# Patient Record
Sex: FEMALE | Race: WHITE | Employment: UNEMPLOYED | URBAN - METROPOLITAN AREA
[De-identification: names, ages, dates, MRNs, and addresses within clinical notes are randomized per-mention and may not be internally consistent; named-entity substitution may affect disease eponyms.]

---

## 2017-08-28 ENCOUNTER — HOSPITAL ENCOUNTER (INPATIENT)
Age: 32
LOS: 10 days | Discharge: HOME | DRG: 885 | End: 2017-09-07
Attending: STUDENT IN AN ORGANIZED HEALTH CARE EDUCATION/TRAINING PROGRAM | Admitting: STUDENT IN AN ORGANIZED HEALTH CARE EDUCATION/TRAINING PROGRAM
Payer: MEDICARE

## 2017-08-28 PROCEDURE — 65220000003 HC RM SEMIPRIVATE PSYCH

## 2017-08-28 RX ORDER — TRAZODONE HYDROCHLORIDE 50 MG/1
50 TABLET ORAL
Status: DISCONTINUED | OUTPATIENT
Start: 2017-08-28 | End: 2017-09-07 | Stop reason: HOSPADM

## 2017-08-28 RX ORDER — HALOPERIDOL 5 MG/ML
5 INJECTION INTRAMUSCULAR
Status: DISCONTINUED | OUTPATIENT
Start: 2017-08-28 | End: 2017-09-07 | Stop reason: HOSPADM

## 2017-08-28 RX ORDER — BENZTROPINE MESYLATE 1 MG/1
1-2 TABLET ORAL
Status: DISCONTINUED | OUTPATIENT
Start: 2017-08-28 | End: 2017-09-07 | Stop reason: HOSPADM

## 2017-08-28 RX ORDER — HALOPERIDOL 5 MG/1
5 TABLET ORAL
Status: DISCONTINUED | OUTPATIENT
Start: 2017-08-28 | End: 2017-09-07 | Stop reason: HOSPADM

## 2017-08-28 RX ORDER — LORAZEPAM 2 MG/ML
1-2 INJECTION INTRAMUSCULAR
Status: DISCONTINUED | OUTPATIENT
Start: 2017-08-28 | End: 2017-09-07 | Stop reason: HOSPADM

## 2017-08-28 RX ORDER — LORAZEPAM 1 MG/1
1-2 TABLET ORAL
Status: DISCONTINUED | OUTPATIENT
Start: 2017-08-28 | End: 2017-09-07 | Stop reason: HOSPADM

## 2017-08-28 RX ORDER — IBUPROFEN 600 MG/1
600 TABLET ORAL
Status: DISCONTINUED | OUTPATIENT
Start: 2017-08-28 | End: 2017-09-07 | Stop reason: HOSPADM

## 2017-08-28 RX ORDER — BENZTROPINE MESYLATE 1 MG/ML
1-2 INJECTION INTRAMUSCULAR; INTRAVENOUS
Status: DISCONTINUED | OUTPATIENT
Start: 2017-08-28 | End: 2017-09-07 | Stop reason: HOSPADM

## 2017-08-28 RX ORDER — HYDROXYZINE PAMOATE 50 MG/1
50 CAPSULE ORAL
Status: DISCONTINUED | OUTPATIENT
Start: 2017-08-28 | End: 2017-09-07 | Stop reason: HOSPADM

## 2017-08-28 NOTE — IP AVS SNAPSHOT
Wisam Gates 
 
 
 [address] [place] [address] 66 Patient: Juan Manuel Laguna MRN: [id number] :1985 You are allergic to the following Allergen Reactions Penicillins Anaphylaxis Emergency Contacts Name Discharge Info Relation Home Work Mobile Randy Pinedo DISCHARGE CAREGIVER [3] Parent [1] [phone] About your hospitalization You were admitted on:  2017 You last received care in the:  [facility] 1 ADULT CHEM DEP You were discharged on:  2017 Unit phone number:  [phone] Why you were hospitalized Your primary diagnosis was:  Not on File Your diagnoses also included:  Schizoaffective Disorder (Hcc) Providers Seen During Your Hospitalizations Provider Role Specialty Primary office phone Aracely Campbell MD Attending Provider Psychiatry [phone] Your Primary Care Physician (PCP) Primary Care Physician Office Phone Office Fax UNKNOWN, PROVIDER ** None ** ** None ** Follow-up Information Follow up With Details Comments Contact Info Provider Unknown   Patient not available to ask [place] On 2017 Intake appointment Maria D Perez at 8:00 a.m. [address] 
[phone] [place] On 2017 Medication management appointment with MAGY urrutia at 11:00 am [address] [place] Barros  Phone: [phone] Fax [phone] Current Discharge Medication List  
  
START taking these medications Dose & Instructions Dispensing Information Comments Morning Noon Evening Bedtime  
 lithium carbonate  mg CR tablet Commonly known as:  Alexx Thorne Your last dose was:     
   
Your next dose is:    
   
   
 Dose:  300 mg  
 Take 1 Tab by mouth two (2) times a day. Indications: Leeanne associated with Bipolar Disorder Quantity:  30 Tab Refills:  0  
     
   
   
   
  
 OLANZapine 10 mg tablet Commonly known as:  ZyPREXA Your last dose was: Your next dose is:    
   
   
 Dose:  10 mg Take 1 Tab by mouth nightly. Indications: Leeanne associated with Bipolar Disorder Quantity:  15 Tab Refills:  0 Where to Get Your Medications Information on where to get these meds will be given to you by the nurse or doctor. ! Ask your nurse or doctor about these medications  
  lithium carbonate  mg CR tablet OLANZapine 10 mg tablet Discharge Instructions BEHAVIORAL HEALTH NURSING DISCHARGE NOTE Numbers to remember: 
Debora Tierney: [phone] [facility] Emergency Services: [phone] Suicide Preventions: [phone] (TALK) Diet: Regular The discharge information has been reviewed with the patient. The patient verbalized understanding. Patient armband removed and shredded Discharge Orders None Wikidot Announcement We are excited to announce that we are making your provider's discharge notes available to you in Wikidot. You will see these notes when they are completed and signed by the physician that discharged you from your recent hospital stay. If you have any questions or concerns about any information you see in Wikidot, please call the Health Information Department where you were seen or reach out to your Primary Care Provider for more information about your plan of care. Introducing [facility]! [facility] introduces Wikidot patient portal. Now you can access parts of your medical record, email your doctor's office, and request medication refills online. 1. In your internet browser, go to [email or web address] 2. Click on the First Time User? Click Here link in the Sign In box.  You will see the New Member Sign Up page. 3. Enter your [employer] Access Code exactly as it appears below. You will not need to use this code after youve completed the sign-up process. If you do not sign up before the expiration date, you must request a new code. · [employer] Access Code: [email or web address] Expires: 11/27/2017  8:55 AM 
 
4. Enter the last four digits of your Social Security Number (xxxx) and Date of Birth (mm/dd/yyyy) as indicated and click Submit. You will be taken to the next sign-up page. 5. Create a [employer] ID. This will be your [employer] login ID and cannot be changed, so think of one that is secure and easy to remember. 6. Create a [employer] password. You can change your password at any time. 7. Enter your Password Reset Question and Answer. This can be used at a later time if you forget your password. 8. Enter your e-mail address. You will receive e-mail notification when new information is available in [address] 9. Click Sign Up. You can now view and download portions of your medical record. 10. Click the Download Summary menu link to download a portable copy of your medical information. If you have questions, please visit the Frequently Asked Questions section of the [employer] website. Remember, [employer] is NOT to be used for urgent needs. For medical emergencies, dial 911. Now available from your iPhone and Android! General Information Please provide this summary of care documentation to your next provider. Patient Signature:  ____________________________________________________________ Date:  ____________________________________________________________  
  
Sharad Early Provider Signature:  ____________________________________________________________ Date:  ____________________________________________________________

## 2017-08-28 NOTE — IP AVS SNAPSHOT
Summary of Care Report The Summary of Care report has been created to help improve care coordination. Users with access to [employer] or [address] (Web-based application) may access additional patient information including the Discharge Summary. If you are not currently a [address] user and need more information, please call the number listed below in the [place] [address] section and ask to be connected with Medical Records. Facility Information Name Address Phone [place] [id number] [facility] [address] [phone] Patient Information Patient Name Sex  Hi Dee ([id number]) Female 1985 Discharge Information Admitting Provider Service Area Unit Hetal Guardado MD / [address] [facility] 1 Adult Chem Dep / [phone] Discharge Provider Discharge Date/Time Discharge Disposition Destination (none) 2017 (Pending) AHR (none) Patient Language Language ENGLISH [13] Hospital Problems as of 2017  Never Reviewed Class Noted - Resolved Last Modified POA Active Problems Schizoaffective disorder ([facility] [address]  2017 - Present 2017 by Hetal Guardado MD Unknown Entered by Hetal Guardado MD  
  
You are allergic to the following Allergen Reactions Penicillins Anaphylaxis Current Discharge Medication List  
  
START taking these medications Dose & Instructions Dispensing Information Comments  
 lithium carbonate  mg CR tablet Commonly known as:  Ruthine Dearth Dose:  300 mg Take 1 Tab by mouth two (2) times a day. Indications: Leeanne associated with Bipolar Disorder Quantity:  30 Tab Refills:  0  
   
 OLANZapine 10 mg tablet Commonly known as:  ZyPREXA Dose:  10 mg Take 1 Tab by mouth nightly.  Indications: Leeanne associated with Bipolar Disorder Quantity:  15 Tab Refills:  0 Follow-up Information Follow up With Details Comments Contact Info Provider Unknown   Patient not available to ask [place] On 9/12/2017 Intake appointment Amanda Jacobson at 8:00 a.m. [address] 
[phone] Donavan On 9/19/2017 Medication management appointment with MAGY urrutia at 11:00 am [address] [place] Barros 68 Phone: [phone] Fax [phone] Discharge Instructions BEHAVIORAL HEALTH NURSING DISCHARGE NOTE Numbers to remember: 
[facility]: [phone] [facility]: [phone] Suicide Preventions: [phone] (TALK) Diet: Regular The discharge information has been reviewed with the patient. The patient verbalized understanding. Patient armband removed and shredded Chart Review Routing History No Routing History on File

## 2017-08-28 NOTE — PROGRESS NOTES
Patient escorted to [address] [place] [address] escorted by police and ; fidgety, unable to sit still, running fingers through her hair, frequently crossing her legs; stated, \"I'm not talking. \" patient refused to answer questions to complete the admission process. Patient requested to shower and accommodated as needed, then patient got in bed and slept for a short time, then tolerated well dinner; afterwards, patient retired to bed; will monitor and maintain safe environment. Patient stated that she is allergic to PCN when asked, if allergic to medications or foods.

## 2017-08-29 PROBLEM — F25.9 SCHIZOAFFECTIVE DISORDER (HCC): Status: ACTIVE | Noted: 2017-08-29

## 2017-08-29 PROCEDURE — 74011250637 HC RX REV CODE- 250/637: Performed by: STUDENT IN AN ORGANIZED HEALTH CARE EDUCATION/TRAINING PROGRAM

## 2017-08-29 PROCEDURE — 65220000003 HC RM SEMIPRIVATE PSYCH

## 2017-08-29 RX ORDER — QUETIAPINE FUMARATE 50 MG/1
200 TABLET, EXTENDED RELEASE ORAL
Status: DISCONTINUED | OUTPATIENT
Start: 2017-08-29 | End: 2017-08-30

## 2017-08-29 RX ADMIN — QUETIAPINE 200 MG: 50 TABLET, EXTENDED RELEASE ORAL at 20:20

## 2017-08-29 NOTE — BH NOTES
Pt.is a 32year old female hospitalized for having thoughts blocking, disorganization and hallucinations. ANT Contact:  SW met with pt to discuss d/c planning. Pt. Expressed she got sick. Pt could not elaborate. Pt presented with thought blocking and disorganization. SW will contact pt.'s boyfriend for a collateral.  Sw was informed the boyfriend was in route to the hospital from [place]. ANT Collateral:  SW talked to pt.'s boyfriend. The boyfriend stated he and the pt met while in treatment 5 years ago. Pt. Stated he and the pt developed a relationship. Pt.at that time was stable on medications. Boyfriend stated pt. Decompensated when she had to come off medication during a pregnancy. Pt. Had a baby last year. The baby was placed up for adoption. Pt. Became depressed. Pt has not been stable on medications. The boyfriend stated pt better responds to  Haldol IM, Trazodone and Prozac. Pt. Per boyfriend was experiencing restlessness, decreased need for sleep and decreased appetite prior to  Admission. Pt. Has been off medications for along time. Pt. Also responds to internal stimuli( talks aloud and has laughing spells). The pt once stable will move back to reside with boyfriend.

## 2017-08-29 NOTE — H&P
History and Physical        Patient: Alonso Lyle               Sex: female          DOA: 8/28/2017         YOB: 1985      Age:  32 y.o.        LOS:  LOS: 1 day        HPI:     Alonso Lyle is a 32 y.o. female who was admitted under TDO experiencing thought blocking, auditory hallucinations, and disorganization. Active Problems:    * No active hospital problems. *      No past medical history on file. No past surgical history on file. No family history on file. Social History     Social History    Marital status:      Spouse name: N/A    Number of children: 2    Years of education:      Social History Main Topics    Smoking status: Not on file    Smokeless tobacco: Not on file    Alcohol use Not on file    Drug use: Not on file    Sexual activity: Not on file     Other Topics Concern    Not on file     Social History Narrative       Prior to Admission medications    Not on File       Allergies   Allergen Reactions    Penicillins Anaphylaxis       Review of Systems  A comprehensive review of systems was negative. Physical Exam:      Visit Vitals    /87    Pulse 87    Temp 98.4 °F (36.9 °C)    Resp 18       Physical Exam:    General:  Alert, cooperative, well developed, well nourished,  female, no distress, appears stated age. Eyes:  Conjunctivae/corneas clear. PERRL, EOMs intact. Fundi benign   Ears:  Normal TMs and external ear canals both ears. Nose: Nares normal. Septum midline. Mucosa normal. No drainage or sinus tenderness. Mouth/Throat: Lips, mucosa, and tongue normal. Teeth, poor dentition, and gums normal.   Neck: Supple, symmetrical, trachea midline, no adenopathy, thyroid: no enlargement/tenderness/nodules, no carotid bruit and no JVD. Back:   Symmetric, no curvature. ROM normal. No CVA tenderness. Lungs:   Clear to auscultation bilaterally.    Heart:  Regular rate and rhythm, S1, S2 normal, no murmur, click, rub or gallop. Abdomen:   Soft, non-tender. Bowel sounds normal. No masses,  No organomegaly. Extremities: Extremities normal, atraumatic, no cyanosis or edema. Pulses: 2+ and symmetric all extremities. Skin: Skin color, texture, turgor normal. No rashes or lesions   Lymph nodes: Cervical, supraclavicular, and axillary nodes normal.   Neurologic: CNII-XII intact. Normal strength, sensation and reflexes throughout.              Assessment/Plan     Disorganized  Auditory hallucinations  Thought blocking  Labs reviewed  Continue treatment per physician's orders

## 2017-08-29 NOTE — PROGRESS NOTES
Problem: Psychosis  Goal: *STG: Decreased hallucinations  Patient will verbalize a decrease in hallucinations. Outcome: Not Progressing Towards Goal  Variance: Patient Condition  Comments: Auditory hallucinations   Goal: *STG: Decreased delusional thinking  Patient will verbalize a decrease in delusional thinking. Outcome: Not Progressing Towards Goal  Variance: Patient Condition  Comments: Paranoid   Goal: *STG: Remains safe in hospital  Patient will remain safe while in hospital.   Outcome: Progressing Towards Goal  Pt has made not attempts to harm self or others     Comments:   Pt is isolating in bed except for meals. She is very brief with her responses and then just stops responding. Pt denies any hallucinations or thoughts of harming self or others. She is however preoccupied and distracted. She states she has no idea why her boyfriend brought her here. Boyfriend here with clothes for pt and was allowed to visit off hours due to driving here from [place].

## 2017-08-29 NOTE — H&P
Psychiatric Intake Note    Date: 17   Patient Name: Kamari Osorio  : 1985  MRN: [id number]  Hospital Day: 2    CC: \"How long do I have to be here? \"    HISTORY OF PRESENT ILLNESS:   Patient is a 31 yo female hx of schizoaffective disorder who presents in psychotic state with thoughts of harm toward mother. Patient is observed giggling to self, appears internally distracted, lying in bed, guarded, does not sit up and speak to this provider. She does not answer questions directly or takes several moments to respond with bizarre, non-contextual answers. Observed talking with other peers and staff on unit, was exposing self to other patients and had to be escorted back to room. Denies SI/HI at this time. Denies AH/VH but appears internally distracted at times and unaware of surroundings.       PSYCHIATRIC HISTORY:  DIAGNOSIS: schizoaffective, bipolar disorder  OUTPATIENT PROVIDERS: unknown  HOSPITALIZATIONS: once prior  SUICIDE ATTEMPTS: denies  CURRENT MEDICATIONS:  none  PRIOR MEDICATIONS: seroquel    PAST MEDICAL/ SURGICAL HISTORY:  denies    ALLERGIES: PCN    FAMILY HISTORY OF MENTAL ILLNESS:   Mother side: unknown  Father's side: unknown  Siblings: unknown    SOCIAL HISTORY:  Childhood: grew up in [address] [place] [address]  Current Living Situation: lives with parents  Relationship status: single  Children: no  Employment: unemployed  Education: has GED  Legal: no outstanding charges    SUBSTANCE USE:  Pt denies    REVIEW OF SYSTEMS: reviewed 10 organ systems- negative, except as noted in HPI    MENTAL STATUS EXAM:  Appearance: Dressed in hospital gown with fair grooming and hygiene, poor dentition  Behavior: guarded, poor eye contact, lying in bed  Motor: No psychomotor agitation/retardation  Speech: Normal rate, tone and volume  Mood: \"\"  Affect: bizarre  Thought Process: disorganized, thought blocking  Thought Content: Denies SI and HI  Perception: Denies AH or VH, appears internally distracted  Concentration: fair  Memory: fair  Cognition: Alert and oriented  Insight: fair  Judgment: fair    RISK ASSESSMENT:   Prior Attempts: no noted prior  Lethality of Attempts: none noted prior  Current Ideation/Plan: no  Weapons at Home: no  Alcohol/Drug Use: no  Protective Factors: limited, no supportive family or peer support  Future Orientation: limited    ASSESSMENT: Patient is a 33 yo female hx of schizoaffective disorder who presents in psychotic state with thoughts of harm toward mother. Pt disorganized, impaired. Diagnoses:  Schizoaffective disorder    Plan:  1. Continue with inpatient psychiatric treatment for safety, stabilization, and medication management  2. Continue with suicide or assault precautions  3. Patient is to continue with Art/OT and family therapy sessions  4. Will need to talk with outpatient providers for more collateral  5. Will need to talk with family/ friends for more collateral  6. Medications:  Start seroquel 200 mg po nightly as pt has benefited from this medicine in the past  7. Labs: reviewed  8. SW to help with disposition  9. ELOS 5-7 days      Krysten Funes MD

## 2017-08-30 LAB — HCG UR QL: NEGATIVE

## 2017-08-30 PROCEDURE — 74011250637 HC RX REV CODE- 250/637: Performed by: STUDENT IN AN ORGANIZED HEALTH CARE EDUCATION/TRAINING PROGRAM

## 2017-08-30 PROCEDURE — 65220000003 HC RM SEMIPRIVATE PSYCH

## 2017-08-30 PROCEDURE — 81025 URINE PREGNANCY TEST: CPT | Performed by: STUDENT IN AN ORGANIZED HEALTH CARE EDUCATION/TRAINING PROGRAM

## 2017-08-30 RX ORDER — OLANZAPINE 5 MG/1
5 TABLET, ORALLY DISINTEGRATING ORAL
Status: DISCONTINUED | OUTPATIENT
Start: 2017-08-30 | End: 2017-09-01

## 2017-08-30 RX ORDER — LITHIUM CARBONATE 300 MG/1
300 TABLET, FILM COATED, EXTENDED RELEASE ORAL 2 TIMES DAILY
Status: DISCONTINUED | OUTPATIENT
Start: 2017-08-30 | End: 2017-09-07 | Stop reason: HOSPADM

## 2017-08-30 RX ADMIN — LORAZEPAM 1 MG: 1 TABLET ORAL at 20:06

## 2017-08-30 RX ADMIN — OLANZAPINE 5 MG: 5 TABLET, ORALLY DISINTEGRATING ORAL at 20:06

## 2017-08-30 RX ADMIN — LITHIUM CARBONATE 300 MG: 300 TABLET, FILM COATED, EXTENDED RELEASE ORAL at 14:07

## 2017-08-30 RX ADMIN — TRAZODONE HYDROCHLORIDE 50 MG: 50 TABLET ORAL at 20:06

## 2017-08-30 RX ADMIN — LORAZEPAM 1 MG: 1 TABLET ORAL at 14:15

## 2017-08-30 RX ADMIN — LITHIUM CARBONATE 300 MG: 300 TABLET, FILM COATED, EXTENDED RELEASE ORAL at 20:06

## 2017-08-30 NOTE — BH NOTES
Pt.quiet,calm,withdrawn and appeared to be disassociated from environment. Required multiple prompts in order to assist in reality orientation or focused behavior while staff attempted to communicate with her. Little to no communication between pt.,peers,staff. Staff encouraged her to communicate thoughts, feelings openly and to seek support if needed. She did not acknowledge staff, but compliant/cooperartive with directions and unit protocol. She was present in group but disinterested. She did not verbalize experiencing any suicidal ideations and preferred to stay to self while reading in day room. She remained free of falls. Staff will continue to monitor and support.

## 2017-08-30 NOTE — PROGRESS NOTES
Patient is alert, oriented to self and time, reoriented to place; very soft tone of voice, unkempt appearance, when approached, patient walked away; patient does not have much to say; mostly responds \"yes\" or \"no\" to questions asked; patient has been awake in bed most of shift, \"just getting some sleep. \" denied thoughts of harm to self or others; denied hallucinations; encouraged to spend more time in the milieu with staff and peers.

## 2017-08-30 NOTE — PROGRESS NOTES
Problem: Falls - Risk of  Goal: *Absence of Falls  Document Reina Fall Risk and appropriate interventions in the flowsheet. Fall Risk Interventions:  Medication Interventions: Teach patient to arise slowly  No falls reported/observed    Problem: Psychosis  Goal: *STG: Decreased hallucinations  Patient will verbalize a decrease in hallucinations. Outcome: Progressing Towards Goal  Denies hallucinations  Goal: *STG: Remains safe in hospital  Patient will remain safe while in hospital.   Outcome: Progressing Towards Goal  No hallucinations    Comments:   Patient ate breakfast and approached nurses station asking for her medications. She was advised she did not have any scheduled medications in the morning. She denied SI/HI/AVH. She stated her mood was good. Her speech is garbled and soft. She has been pacing all morning and has been to the nurses station several times asking for her drinks and snacks. She has been redirected to staff for these items. She is unkempt and had to be redirected to button her shirt up as it was mostly unbuttoned. She will remain on suicide precautions. Staff will continue to monitor q15 minutes for safety. Staff and nurses will continue to provide a safe and supportive environment.

## 2017-08-30 NOTE — BH NOTES
Psychiatric Progress Note    Date: 17  Patient Name: Janna Ross  : 1985  MRN: [id number]  Hospital Day: 3      INTERVAL HISTORY:   Patient is observed lying in bed, face down in bed. When asked to speak, she comes out to speak in exam room. Then says she was going to group, but stays to speak to this provider. She is looking at floor and mouthing words at times. Answers only some questions, limited facial expressions. Does not open up with this provider. Arms crossed and continues to fidget and shake legs, crossing and uncrossing legs. Attempted to discuss her children and psychiatric history in more depth. She gives one word replies and some questions, does not answer. When asked about hallucinations, does not answer. Not suicidal/ homicidal at this time. MENTAL STATUS EXAM:  Appearance: Dressed in casual clothes, appears malnourished  Behavior: Cooperative with poor eye contact  Motor: +psychomotor agitation, constant fidgeting, crossing/ uncrossing legs  Speech: Normal rate, tone and volume  Mood: \"okay\"  Affect: flat  Thought Process: thought blocking, limited  Thought Content: Denies SI and HI  Perception: does not answer, but appears internally distracted  Concentration: fair  Memory: fair  Cognition: Alert and oriented  Insight: Fair  Judgment: Fair    RISK ASSESSMENT:   Prior Attempts: no noted prior  Lethality of Attempts: none noted prior  Current Ideation/Plan: denies  Protective Factors: limited  Future Orientation: limited    ASSESSMENT:   Still disorganized, psychotic, unaware of surroundings, bizarre    Diagnoses:  Schizoaffective disorder, bipolar type    Plan:  1. Continue with inpatient psychiatric treatment for containment, stabilization and medication management  2. Patient is to continue with group therapy  3. Medications:   Lithium  mg po bid  Zyprexa 5 mg po qhs  4. SW to help with disposition  5.  ELOS 5-7 days

## 2017-08-30 NOTE — BSMART NOTE
ACTIVITIES THERAPY PROGRESS NOTE    Group time:1163  . The patient did not get up when called for group. Alejandro Roth

## 2017-08-30 NOTE — BH NOTES
Pt.disoriented to environment, wandered into male patients room to use restroom. Easily re-directed/re-oriented by staff.

## 2017-08-30 NOTE — BH NOTES
Pt.is a 32year old female hospitalized for having thoughts blocking, disorganization and hallucinations.        SW discussed case in treatment team.    SW Contact:  SW met with pt to discuss d/c planning. SW attempted to talk to pt. Pt. Appeared restless and and was disorganized.

## 2017-08-30 NOTE — BH NOTES
GROUP THERAPY PROGRESS NOTE    Yokasta Rowell is participating in [employer]. Group time: 30 min    Personal goal for participation: Treatment focus    Goal orientation: Community    Group therapy participation: minimal    Therapeutic interventions reviewed and discussed: Unit protocol and Coping skill education    Impression of participation: Pt.  Was present but minimally engaged and kept to self

## 2017-08-30 NOTE — BSMART NOTE
ART THERAPY GROUP PROGRESS NOTE    Group time:9095    The patient was inappropriate for group. She was unable to focus nad spent the entire group pacing back and forth to the nurse's station.

## 2017-08-31 PROCEDURE — 74011250637 HC RX REV CODE- 250/637: Performed by: STUDENT IN AN ORGANIZED HEALTH CARE EDUCATION/TRAINING PROGRAM

## 2017-08-31 PROCEDURE — 65220000003 HC RM SEMIPRIVATE PSYCH

## 2017-08-31 RX ORDER — IBUPROFEN 200 MG
1 TABLET ORAL DAILY
Status: DISCONTINUED | OUTPATIENT
Start: 2017-09-01 | End: 2017-08-31 | Stop reason: SDUPTHER

## 2017-08-31 RX ORDER — IBUPROFEN 200 MG
1 TABLET ORAL DAILY
Status: DISCONTINUED | OUTPATIENT
Start: 2017-08-31 | End: 2017-09-07 | Stop reason: HOSPADM

## 2017-08-31 RX ADMIN — OLANZAPINE 5 MG: 5 TABLET, ORALLY DISINTEGRATING ORAL at 20:34

## 2017-08-31 RX ADMIN — LITHIUM CARBONATE 300 MG: 300 TABLET, FILM COATED, EXTENDED RELEASE ORAL at 20:35

## 2017-08-31 RX ADMIN — LITHIUM CARBONATE 300 MG: 300 TABLET, FILM COATED, EXTENDED RELEASE ORAL at 08:29

## 2017-08-31 RX ADMIN — TRAZODONE HYDROCHLORIDE 50 MG: 50 TABLET ORAL at 20:35

## 2017-08-31 RX ADMIN — LORAZEPAM 2 MG: 1 TABLET ORAL at 13:19

## 2017-08-31 NOTE — BSMART NOTE
ACTIVITIES THERAPY PROGRESS NOTE    Group time:1220    The patient did not refuse, but, did not come to group. Not clear if patient able to comprehend or focus on request to attend group, she did say she was just going to [employer]". Did comply when asked to put up snacks and magazines at group time.

## 2017-08-31 NOTE — BH NOTES
GROUP THERAPY PROGRESS NOTE    Macario Delgado is participating in [employer].      Group time: 30 minutes    Goal orientation: community    Group therapy participation: active    Therapeutic interventions reviewed and discussed: Evening announcements    Impression of participation: Patient participated in group

## 2017-08-31 NOTE — BH NOTES
GROUP THERAPY PROGRESS NOTE    Ryan John is not  participating in [facility]. Group time: 30 minutes    Personal goal for participation: rules/ regulations     Goal orientation: community    Group therapy participation: pt refused    Therapeutic interventions reviewed and discussed: Pt refused group with much encouragement by staff. Impression of participation: The above pt refused group after several attempts by staff attempting to wake her prior to group starting.

## 2017-08-31 NOTE — PROGRESS NOTES
Problem: Falls - Risk of  Goal: *Absence of Falls  Document Reina Fall Risk and appropriate interventions in the flowsheet. Outcome: Progressing Towards Goal  Fall Risk Interventions:         Pt remains free of falls. Medication Interventions: Teach patient to arise slowly                       Problem: Psychosis  Goal: *STG: Remains safe in hospital  Patient will remain safe while in hospital.   Outcome: Progressing Towards Goal  Pt remains safe. Goal: *STG/LTG: Complies with medication therapy  Patient will be compliant with medications during stay in hospital.   Outcome: Progressing Towards Goal  Pt is taking medications as ordered. Comments:   Patient has been mainly in the milieu today except when she wanders off. Patient was found in another patient's bed several times today and finally the other patient asked to move to a new room. Patient continues to sit in milieu and will only interact when asking for snacks. Patient avoids conversations when approached, and patient walks in other direction. Patient presents as disorganized and somewhat paranoid. Patient is compliant with medications and has been sitting quietly in the milieu most of the morning sitting alone.

## 2017-08-31 NOTE — BH NOTES
Psychiatric Progress Note    Date: 17  Patient Name: Emily Tejada  : 1985  MRN: [id number]  Hospital Day: 4      INTERVAL HISTORY:   Patient is observed in day room flipping through magazine, eating snacks. Frequently eating snacks outside of meal times, does not engage in groups or peers. Stays to self mostly. She says she is \"fine,\" takes a very long time answering questions. She asks when she can leave, but can't state where she'll go or how she'll care for self. Eventually, after minimal responses, she asks for a cigarette. When offered a patch, she says okay. Continues to flip through magazines and limited answers. When asked about hallucinations, does not answer. Not suicidal/ homicidal at this time. MENTAL STATUS EXAM:  Appearance: Dressed in casual clothes, appears malnourished  Behavior: Cooperative with poor eye contact  Motor: +psychomotor agitation, constant fidgeting, crossing/ uncrossing legs  Speech: Normal rate, tone and volume  Mood: \"fine\"  Affect: flat  Thought Process: thought blocking, limited  Thought Content: Denies SI and HI  Perception: does not answer, but appears internally distracted  Concentration: fair  Memory: fair  Cognition: Alert and oriented  Insight: Fair  Judgment: Fair    RISK ASSESSMENT:   Prior Attempts: no noted prior  Lethality of Attempts: none noted prior  Current Ideation/Plan: denies  Protective Factors: limited  Future Orientation: limited    ASSESSMENT:   Still impaired, unaware of surroundings, bizarre    Diagnoses:  Schizoaffective disorder, bipolar type    Plan:  1. Continue with inpatient psychiatric treatment for containment, stabilization and medication management  2. Patient is to continue with group therapy  3. Medications:   Lithium  mg po bid  Zyprexa 5 mg po qhs  4. SW to help with disposition  5.  ELOS 5-7 days

## 2017-08-31 NOTE — BH NOTES
Pt.is a 32year old female hospitalized for having thoughts blocking, disorganization and hallucinations.           SW Contact:  SW met with pt to discuss d/c planning.  Pt. Expressed she was feeling okay. Pt. Stated she was feeling fine. Pt ask could she smoke. SW explained the no smoking , smoke free facility policy. SW discussed the importance of medication and treatment compliance. Pt. admits she was  at one time for 11 years before she got sick. Pt mentioned she had two children and one miscarriage. Pt. Stated she use to see her children before her ex-spouse moved to [place]. SW ask pt about previous placement and current living arrangements. Pt got up and left. SW called pt back and pt was observed talking to self. Pt. Appeared preoccupied, fidgety, no direct eye contact and lacks insight.

## 2017-08-31 NOTE — BH NOTES
GROUP THERAPY PROGRESS NOTE    Keshav Adkins is not  participating in Recreational Therapy. Group time: 30 minutes    Personal goal for participation: fresh air break     Goal orientation: relaxation    Group therapy participation: pt refused     Therapeutic interventions reviewed and discussed: Pt refused group with much encouragement by staff. Impression of participation: The above pt refused to participate in fresh air break during this shift.

## 2017-09-01 PROCEDURE — 65220000003 HC RM SEMIPRIVATE PSYCH

## 2017-09-01 PROCEDURE — 74011250637 HC RX REV CODE- 250/637: Performed by: STUDENT IN AN ORGANIZED HEALTH CARE EDUCATION/TRAINING PROGRAM

## 2017-09-01 RX ORDER — OLANZAPINE 5 MG/1
10 TABLET, ORALLY DISINTEGRATING ORAL
Status: DISCONTINUED | OUTPATIENT
Start: 2017-09-01 | End: 2017-09-07 | Stop reason: HOSPADM

## 2017-09-01 RX ADMIN — LITHIUM CARBONATE 300 MG: 300 TABLET, FILM COATED, EXTENDED RELEASE ORAL at 08:58

## 2017-09-01 RX ADMIN — OLANZAPINE 10 MG: 5 TABLET, ORALLY DISINTEGRATING ORAL at 20:48

## 2017-09-01 RX ADMIN — LITHIUM CARBONATE 300 MG: 300 TABLET, FILM COATED, EXTENDED RELEASE ORAL at 20:48

## 2017-09-01 NOTE — BH NOTES
Psychiatric Progress Note    Date: 17  Patient Name: Donnie Whitfield  : 1985  MRN: [id number]  Hospital Day: 5      INTERVAL HISTORY:   Patient is very guarded today, barely communicates with provider. When asked questions, she will whisper inaudibly under breath or look down to floor and then look up at [address] and say, Rosi Amanda did you say? \" Concentration very impaired and difficult to engage. She abruptly gets up and says \"I have to go back\" and leaves interview room. MENTAL STATUS EXAM:  Appearance: Dressed in casual clothes, appears malnourished  Behavior: Cooperative with poor eye contact  Motor: +psychomotor agitation, constant fidgeting  Speech: Normal rate, tone and volume  Mood: \"fine\"  Affect: flat  Thought Process: thought blocking, limited  Thought Content: Denies SI and HI  Perception: does not answer, but whispers to self  Concentration: fair  Memory: fair  Cognition: Alert and oriented  Insight: Fair  Judgment: Fair    RISK ASSESSMENT:   Prior Attempts: no noted prior  Lethality of Attempts: none noted prior  Current Ideation/Plan: denies  Protective Factors: limited  Future Orientation: limited    ASSESSMENT:   Still impaired, unaware of surroundings, bizarre    Diagnoses:  Schizoaffective disorder, bipolar type    Plan:  1. Continue with inpatient psychiatric treatment for containment, stabilization and medication management  2. Patient is to continue with group therapy  3. Medications:   Lithium  mg po bid  incr Zyprexa 10 mg po qhs (5--> 10 17)  4. SW to help with disposition  5.  ELOS 5-7 days

## 2017-09-01 NOTE — BH NOTES
GROUP THERAPY PROGRESS NOTE    Echevarria Dedra is participating in [place].      Group time: 30 minutes    Personal goal for participation: none    Goal orientation: community    Group therapy participation: minimal    Therapeutic interventions reviewed and discussed: goals and procedures    Impression of participation: encouraged

## 2017-09-01 NOTE — BH NOTES
Pt.is a 32year old female hospitalized for having thoughts blocking, disorganization and hallucinations.          SW Contact:  SW met with pt to discuss d/c planning.  Pt. Was very distracted not really responding to any questions this SW asked her. Pt. continues to have thought blocking.

## 2017-09-01 NOTE — BH NOTES
Pt spent most of the time in the day room area pacing around. Pt had to be redirected on two occassions attempting to go into the wrong room to use the bathroom. Pt complied with redirection and was monitored throughout the shift. Pt was med compliant and no other behaviors were observed.

## 2017-09-01 NOTE — PROGRESS NOTES
Problem: Psychosis  Goal: *STG: Decreased hallucinations  Patient will verbalize a decrease in hallucinations. Outcome: Progressing Towards Goal  Denies auditory and visual hallucinations. Goal: *STG: Decreased delusional thinking  Patient will verbalize a decrease in delusional thinking. Outcome: Not Progressing Towards Goal  Lacks insight  Goal: *STG: Seeks staff when feelings of self harm or harm towards others arise  Patient will alert staff for thoughts or feelings to harm self or others while in hospital.   Outcome: Progressing Towards Goal  Denies suicidal thoughts. Comments:   Patient sitting in day area looking at magazines, poor eye contact. Some thought blocking noted. Denies auditory and visual hallucinations. Preoccupied , no behavior issues.

## 2017-09-02 PROCEDURE — 74011250637 HC RX REV CODE- 250/637: Performed by: STUDENT IN AN ORGANIZED HEALTH CARE EDUCATION/TRAINING PROGRAM

## 2017-09-02 PROCEDURE — 65220000003 HC RM SEMIPRIVATE PSYCH

## 2017-09-02 RX ADMIN — OLANZAPINE 10 MG: 5 TABLET, ORALLY DISINTEGRATING ORAL at 21:18

## 2017-09-02 RX ADMIN — LITHIUM CARBONATE 300 MG: 300 TABLET, FILM COATED, EXTENDED RELEASE ORAL at 21:18

## 2017-09-02 RX ADMIN — LITHIUM CARBONATE 300 MG: 300 TABLET, FILM COATED, EXTENDED RELEASE ORAL at 09:01

## 2017-09-02 NOTE — BH NOTES
[facility] was monitored by staff for health and safety, she was delusional throughout most of the evening, she was encouraged by staff to lay down and rest.  She  complied with staff and took her medication, this writer to monitor Patient.

## 2017-09-02 NOTE — BH NOTES
Psychiatric Progress Note    Date: 17  Patient Name: Braden De La Garza  : 1985  MRN: [id number]  Hospital Day: 6      INTERVAL HISTORY:   Patient is very guarded today, does not say a word to this provider. Was asked several questions and patient does not make eye contact. She is mouthing words to self, looking at the floor, appears internally distracted. Has limited interactions with staff, eating/ sleeping well. Calm on the unit, but heard talking to self at times in room. MENTAL STATUS EXAM:  Appearance: Dressed in casual clothes, appears malnourished  Behavior: guarded, poor eye contact,  lying in bed  Motor: slowed  Speech: Normal rate, tone and volume  Mood: \"fine\"  Affect: flat  Thought Process: thought blocking, limited  Thought Content: does not answer  Perception: does not answer, but whispers to self, mouths words to self  Cannot assess insight, judgment, poor concentration    RISK ASSESSMENT:   Prior Attempts: no noted prior  Lethality of Attempts: none noted prior  Current Ideation/Plan: denies  Protective Factors: limited  Future Orientation: limited    ASSESSMENT:   Still impaired, unaware of surroundings, bizarre    Diagnoses:  Schizoaffective disorder, bipolar type    Plan:  1. Continue with inpatient psychiatric treatment for containment, stabilization and medication management  2. Patient is to continue with group therapy  3. Medications:   Lithium  mg po bid  incr Zyprexa 10 mg po qhs (5--> 10 17)  4. SW to help with disposition  5.  ELOS 5-7 days

## 2017-09-02 NOTE — BH NOTES
GROUP THERAPY PROGRESS NOTE    Ryan John is not participating in [place].      Group time: 30 minutes    Personal goal for participation: refused    Goal orientation: community    Group therapy participation: minimal    Therapeutic interventions reviewed and discussed: goals and procedures     Impression of participation: encouraged

## 2017-09-02 NOTE — PROGRESS NOTES
Problem: Psychosis  Goal: *STG: Remains safe in hospital  Patient will remain safe while in hospital.   Outcome: Progressing Towards Goal  Remains safe in hospital  Goal: *STG: Seeks staff when feelings of self harm or harm towards others arise  Patient will alert staff for thoughts or feelings to harm self or others while in hospital.   Outcome: Not Progressing Towards Goal  Pt does not verbalize nor seek staff with feelings of self harm or harm to others  Goal: *STG/LTG: Complies with medication therapy  Patient will be compliant with medications during stay in hospital.   Outcome: Progressing Towards Goal  Compliant with medication therapy    Comments:   Patient is quiet, does not interact with staff when approached and uncooperative with assessment, has flat affect, states\"I am okay\". Pt stayed in room in the morning with staff encouragement to be in milieu and have breakfast, resting in bed. Compliant with medication therapy. Encouraged use of gripper socks, remains safe in hospital. Continue to monitor safety and provide support as needed.

## 2017-09-03 PROCEDURE — 74011250637 HC RX REV CODE- 250/637: Performed by: STUDENT IN AN ORGANIZED HEALTH CARE EDUCATION/TRAINING PROGRAM

## 2017-09-03 PROCEDURE — 65220000003 HC RM SEMIPRIVATE PSYCH

## 2017-09-03 RX ADMIN — LITHIUM CARBONATE 300 MG: 300 TABLET, FILM COATED, EXTENDED RELEASE ORAL at 11:13

## 2017-09-03 RX ADMIN — LITHIUM CARBONATE 300 MG: 300 TABLET, FILM COATED, EXTENDED RELEASE ORAL at 22:10

## 2017-09-03 RX ADMIN — OLANZAPINE 10 MG: 5 TABLET, ORALLY DISINTEGRATING ORAL at 22:10

## 2017-09-03 NOTE — PROGRESS NOTES
Problem: Psychosis  Goal: *STG: Remains safe in hospital  Patient will remain safe while in hospital.   Outcome: Progressing Towards Goal  Remains safe in hospital  Goal: *STG: Participates in individual and group therapy  Patient will participate in groups while in hospital.   Outcome: Not Progressing Towards Goal  Pt does not a\participate in group and activities  Goal: *STG/LTG: Complies with medication therapy  Patient will be compliant with medications during stay in hospital.   Outcome: Progressing Towards Goal  Pt compliant with medication therapy    Comments:   Pt is quiet, non interactive with staff approach but will come up to desk for assistance with needs, soda at locker. When asked about SI/HI, A/VH, she does not respond back during assessment, states she is okay. Does not attend group and activities with staff encouragement. Pt coloring at milieu, has crossword puzzle with her things. She refused breakfast and ate lunch. Compliant with medication therapy. Encouraged use of gripper socks,continues to monitor for safety and provide supportive environment.

## 2017-09-03 NOTE — BH NOTES
Psychiatric Progress Note    Date: 17  Patient Name: Rocky Shields  : 1985  MRN: [id number]  Hospital Day: 7      INTERVAL HISTORY:   Patient is unchanged from yesterday, still very guarded today, poor eye contact, appears internally distracted. Staff reports that she eats meals, can communicate needs but limited and keeps to self mostly. MENTAL STATUS EXAM:  Appearance: Dressed in casual clothes, appears malnourished  Behavior: guarded, poor eye contact,  lying in bed  Motor: slowed  Speech: Normal rate, tone and volume  Mood: \"fine\"  Affect: flat  Thought Process: thought blocking, limited  Thought Content: does not answer  Perception: does not answer, but whispers to self, mouths words to self  Cannot assess insight, judgment, poor concentration    RISK ASSESSMENT:   Prior Attempts: no noted prior  Lethality of Attempts: none noted prior  Current Ideation/Plan: denies  Protective Factors: limited  Future Orientation: limited    ASSESSMENT:   Still impaired, unaware of surroundings, bizarre    Diagnoses:  Schizoaffective disorder, bipolar type    Plan:  1. Continue with inpatient psychiatric treatment for containment, stabilization and medication management  2. Patient is to continue with group therapy  3. Medications:   Lithium  mg po bid  incr Zyprexa 10 mg po qhs (5--> 10 17)  4. SW to help with disposition  5.  ELOS 5-7 days

## 2017-09-03 NOTE — BH NOTES
Patient was isolated to room most of the shift, when out in milieu she looked dazed and confused as if she was in an unfamiliar surrounding. Patient seemed annoyed when asked to get up for bedtime medication but reluctantly came to nurses station. Patient  responded with intermittent one word answers. Denies any thoughts to harm self or a/v hallucinations.

## 2017-09-04 VITALS
TEMPERATURE: 98.3 F | SYSTOLIC BLOOD PRESSURE: 118 MMHG | DIASTOLIC BLOOD PRESSURE: 74 MMHG | HEART RATE: 106 BPM | RESPIRATION RATE: 18 BRPM

## 2017-09-04 PROCEDURE — 65220000003 HC RM SEMIPRIVATE PSYCH

## 2017-09-04 PROCEDURE — 74011250637 HC RX REV CODE- 250/637: Performed by: STUDENT IN AN ORGANIZED HEALTH CARE EDUCATION/TRAINING PROGRAM

## 2017-09-04 RX ADMIN — OLANZAPINE 10 MG: 5 TABLET, ORALLY DISINTEGRATING ORAL at 20:20

## 2017-09-04 RX ADMIN — LITHIUM CARBONATE 300 MG: 300 TABLET, FILM COATED, EXTENDED RELEASE ORAL at 10:09

## 2017-09-04 RX ADMIN — LITHIUM CARBONATE 300 MG: 300 TABLET, FILM COATED, EXTENDED RELEASE ORAL at 20:19

## 2017-09-04 RX ADMIN — HALOPERIDOL 5 MG: 5 TABLET ORAL at 17:08

## 2017-09-04 NOTE — BH NOTES
GROUP THERAPY PROGRESS NOTE    Macario Danny is participating in Recreational Therapy.      Group time: 15:30  Personal goal for participation: Relaxation    Goal orientation: personal/relaxation    Group therapy participation: minimal    Therapeutic interventions reviewed and discussed: Relaxation and music therapy as coping skill/ anxiety reduction    Impression of participation: Pt.confused at times and pacing but present during group and engaged/enjoying  music therapy

## 2017-09-04 NOTE — BH NOTES
GROUP THERAPY PROGRESS NOTE    Neetu Cho is not participating in unit activities despite education and encouragement towards treatment compliance. .     Group time: 0 minutes    Personal goal for participation: Not in attendance    Goal orientation: Non in attendance    Group therapy participation: Not in Attendance     Therapeutic interventions reviewed and discussed: Not in Attendance    Impression of participation: Not in Attendance

## 2017-09-04 NOTE — BH NOTES
Patient observed pacing on the unit in the day area, laughing inappropriately as she paced and responding to internal stimuli. 1705 Patient continues to pace in the day room, appears irritable as she responds to internal stimuli. Patient received Haldol 5 mg PO PRN.  Will continue to monitor

## 2017-09-04 NOTE — BH NOTES
GROUP THERAPY PROGRESS NOTE    Lesli Morejon was encouraged by staff but refused to participate in  Community.

## 2017-09-04 NOTE — PROGRESS NOTES
NUTRITION    Nutrition Screen      RECOMMENDATIONS / PLAN:     - Obtain weight as able to pending pt's cooperation  - No nutrition intervention indicated at this time. Will re-screen as appropriate. NUTRITION DIAGNOSIS & INTERVENTIONS:     Nutrition Diagnosis:  No nutrition diagnosis at this time    ASSESSMENT:     Pt with poor engagement in discussion. Did not answer many questions. Reported good appetite and meal intake. Discussed with nursing to obtain weight if able to; pt not interacting with unit staff at this time. Average intake adequate to meet patients estimated nutritional needs:   [x] Yes     [] No      [] Unable to determine at this time    Diet: DIET REGULAR    Food Allergies:  None known   Current Appetite:   [x] Good     [] Fair     [] Poor     [] Other:  Appetite/meal intake prior to admission:   [x] Good     [] Fair     [] Poor     [] Other:   Feeding Limitations:  [] Swallowing Difficulty       [] Chewing Difficulty       [] Other   Current Meal Intake: No data found. Gastrointestinal Issues:  [] Yes    [x] No   Skin Integrity:  WDL    Pertinent Medications:  Reviewed   Labs:  Reviewed     Anthropometrics:  Ht Readings from Last 1 Encounters:   No data found for Ht       Last 3 Recorded Weights in this Encounter       There is no height or weight on file to calculate BMI. BMI 19.97 kg/m^2:  Based off of estimated height and weight        Weight History: Unable to obtain weight or height information from pt  No flowsheet data found. Admitting Diagnosis: F25.9 schizoaffective d/o  [facility]  No past medical history on file.      Education Needs:        [x] None identified  [] Identified - Not appropriate at this time  []  Identified and addressed - refer to education log  Learning Limitations:   [x] None identified  [] Identified    Cultural, Advent & ethnic food preferences identified:  [x] None    [] Yes      ESTIMATED NUTRITION NEEDS:     2119-2813 kcal (MSJx1.2-1.3), 44-55 gm protein (0.8-1 gm/kg), 1 mL/kcal  Based on:  55 kg       [x] Estimated BW      [] IBW          MONITORING & EVALUATION:     Nutrition Goal(s):   1. Po intake of meals will meet >75% of patient estimated nutritional needs within the next 7 days.   Outcome:   [] Met    []  Not Met   [x] New/Initial Goal    Monitor:  [x] Meal intake    [x] Diet tolerance    [] Supplement intake    Previous Recommendations (for follow-up assessments only):     []   Implemented       []   Not Implemented (RD to address)    [] No Recommendation Made      Discharge Planning: regular diet   [x]  Participated in care planning, discharge planning, & interdisciplinary rounds as appropriate      Louise Leong, [address] [place] [address]   Pager: [phone]

## 2017-09-04 NOTE — BH NOTES
Psychiatric Progress Note    Date: 17  Patient Name: Lesli Morejon  : 1985  MRN: [id number]  Hospital Day: 8      INTERVAL HISTORY:   Patient is limited, asked several questions this morning and stares into space without answering or responding to this provider. She says she \"couldn't make it to the bathroom in time\" and \"jeans are wet. \" When asked how this happened, she doesn't answer. She picks up new pair of jeans and changes clothes in front of this provider, then walks out of room and down garcia. When attempting to ask further questions, she ignores this provider and instead, asks staff for breakfast and begins eating breakfast.       MENTAL STATUS EXAM:  Appearance: Dressed in casual clothes, appears malnourished  Behavior: guarded, poor eye contact,  lying in bed  Motor: slowed  Speech: Normal rate, tone and volume  Mood: \"I don't know\"  Affect: flat  Thought Process: thought blocking, limited  Thought Content: denies SI/HI  Perception: does not answer, appears internally distracted  Poor insight, poor to fair judgment    RISK ASSESSMENT:   Prior Attempts: no noted prior  Lethality of Attempts: none noted prior  Current Ideation/Plan: denies  Protective Factors: limited  Future Orientation: limited    ASSESSMENT:   Still impaired, unaware of surroundings, bizarre    Diagnoses:  Schizoaffective disorder, bipolar type    Plan:  1. Continue with inpatient psychiatric treatment for containment, stabilization and medication management  2. Patient is to continue with group therapy  3. Medications:   Lithium  mg po bid  Zyprexa 10 mg po qhs (5--> 10 17)  4. SW to help with disposition  5.  ELOS 5-7 days

## 2017-09-04 NOTE — PROGRESS NOTES
GROUP THERAPY PROGRESS NOTE    Braden De La Garza did not attend Coping Skills Group.      Group time: 20 minutes    Personal goal for participation: positive coping skills    Goal orientation: personal    Group therapy participation: patient did not attend group

## 2017-09-04 NOTE — BH NOTES
Patient  Has remained in room for majority of the shift out with encouragement for meals and medication. Patient appears anxious when in milieu. Patient has been compliant with prescribed medication. Patient observed laughing loudly while sitting alone in milieu. Patient hs not showered or completed hygiene care despite education and encouragement. Although patient denies auditory hallucinations patient appears to be responding to internal stimuli. Patient denies suicidal ideation. Will continue to monitor for safety with support as needed throughout treatment regimen.

## 2017-09-04 NOTE — BH NOTES
GROUP THERAPY PROGRESS NOTE    Gardenia Blandon was encouraged by staff but refused to participate in  Community.

## 2017-09-04 NOTE — PROGRESS NOTES
Problem: Psychosis  Goal: *STG: Decreased hallucinations  Patient will verbalize a decrease in hallucinations. Outcome: Not Progressing Towards Goal  Visual and auditory hallucinations   Goal: *STG: Decreased delusional thinking  Patient will verbalize a decrease in delusional thinking. Outcome: Not Progressing Towards Goal  Paranoid   Goal: *STG: Remains safe in hospital  Patient will remain safe while in hospital.   Outcome: Progressing Towards Goal  No attempts to harm self or others   Goal: *STG: Participates in individual and group therapy  Patient will participate in groups while in hospital.   Outcome: Not Progressing Towards Goal  Variance: Patient Condition  Comments: Pt is not participating   Goal: *STG/LTG: Complies with medication therapy  Patient will be compliant with medications during stay in hospital.   Outcome: Progressing Towards Goal  Compliant   Goal: *STG/LTG: Demonstrates improved thought patterns as evidenced by logical and coherent speech  Patient will verbalize improved thought process with clear and coherent speech while at hospital.   Outcome: Not Progressing Towards Goal  Variance: Patient Condition  Comments: Pt is mostly mute     Comments:   Pt is isolating in bed and has to be encouraged to come out for meals and meds. She was compliant with medications and is slowly eating her breakfast. She briefly spoke with the MD but would not respond to this nurse with two separate attempts at one to one. Pt is sitting in the day area talking to and looking at no one. Her affect is bizarre and inappropriate to circumstances.

## 2017-09-05 PROCEDURE — 65220000003 HC RM SEMIPRIVATE PSYCH

## 2017-09-05 PROCEDURE — 74011250637 HC RX REV CODE- 250/637: Performed by: STUDENT IN AN ORGANIZED HEALTH CARE EDUCATION/TRAINING PROGRAM

## 2017-09-05 RX ADMIN — OLANZAPINE 10 MG: 5 TABLET, ORALLY DISINTEGRATING ORAL at 20:58

## 2017-09-05 RX ADMIN — LITHIUM CARBONATE 300 MG: 300 TABLET, FILM COATED, EXTENDED RELEASE ORAL at 20:57

## 2017-09-05 RX ADMIN — LITHIUM CARBONATE 300 MG: 300 TABLET, FILM COATED, EXTENDED RELEASE ORAL at 09:10

## 2017-09-05 NOTE — BH NOTES
Pt throughout the shift was pacing and laughing to herself in the dayroom area. Pt had to be redirected on two occasion for almost going into another pt room. Since admission, Pt behavior has seemed a little bit better but still displays some thought blocking at times. Staff will continue to monitor for safety and well being.

## 2017-09-05 NOTE — PROGRESS NOTES
Problem: Psychosis  Goal: *STG: Decreased hallucinations  Patient will verbalize a decrease in hallucinations. Outcome: Not Progressing Towards Goal  Auditory hallucinations   Goal: *STG: Decreased delusional thinking  Patient will verbalize a decrease in delusional thinking. Outcome: Not Progressing Towards Goal  Paranoid   Goal: *STG: Remains safe in hospital  Patient will remain safe while in hospital.   Outcome: Progressing Towards Goal  No unsafe behaviors   Goal: *STG: Participates in individual and group therapy  Patient will participate in groups while in hospital.   Outcome: Not Progressing Towards Goal  Not participating in one to one with staff or groups. He talks with MD   Goal: *STG/LTG: Complies with medication therapy  Patient will be compliant with medications during stay in hospital.   Outcome: Progressing Towards Goal  Compliant     Comments:   Pt continues to not respond to this nurse with attempts at one to one. She does approach staff when she wants something and asks for it. Pt continues to look at and talk to no one there. She also laughs out loud inappropriately. Pt is compliant with medications but does not participate in groups. She is guarded and isolative not spontaneously interacting with staff or peers.

## 2017-09-05 NOTE — BH NOTES
Psychiatric Progress Note    Date: 17  Patient Name: Chidi Alanis  : 1985  MRN: [id number]  Hospital Day: 9      INTERVAL HISTORY:   Patient is somewhat more conversational, does not speak spontaneously, only one-two word responses to questions. Poor eye contact, no complaints. Extremely flat affect, isolation of affect. She is eating better and sleeping well at night, per nursing. No side effects from meds, taking meds. Denies SI/HI. Denies AVH      MENTAL STATUS EXAM:  Appearance: Dressed in casual clothes, very thin  Behavior: guarded, poor eye contact,  lying in bed  Motor: slowed  Speech: Normal rate, tone and volume  Mood: \"fine\"  Affect: flat  Thought Process: thought blocking, limited  Thought Content: denies SI/HI  Perception: denies AV  Poor insight, poor to fair judgment    RISK ASSESSMENT:   Prior Attempts: no noted prior  Lethality of Attempts: none noted prior  Current Ideation/Plan: denies  Protective Factors: limited  Future Orientation: limited    ASSESSMENT:   Still impaired, somewhat more conversational than days prior    Diagnoses:  Schizoaffective disorder, bipolar type    Plan:  1. Continue with inpatient psychiatric treatment for containment, stabilization and medication management  2. Patient is to continue with group therapy  3. Medications:   Lithium  mg po bid  Zyprexa 10 mg po qhs (5--> 10 17)  4. SW to help with disposition  5.  ELOS 5-7 days

## 2017-09-05 NOTE — BH NOTES
Patient observed slept for about 2 hours since visiting time until medication time, upon awakening patient sat quietly in the day room to eat bedtime snacks, observed still smiliing with occassional laughter as she ate snacks. Patient received bedtime medications without difficulty. Patient was encouraged back to bed and offered extra blanket and extra gown for complaint of feeling cold. No maladaptive behavior observed at this time.  Will continue to monitor

## 2017-09-05 NOTE — BH NOTES
GROUP THERAPY PROGRESS NOTE    Edie Ibarra is participating in [employer].      Group time: 30 minutes    Personal goal for participation: none    Goal orientation: community    Group therapy participation: refused    Therapeutic interventions reviewed and discussed: goals and procedures    Impression of participation: encouraged

## 2017-09-05 NOTE — BH NOTES
Pt.is a 32year old female hospitalized for having thoughts blocking, disorganization and hallucinations.           ANT Contact:  SW met with pt to discuss d/c planning.  Pt. Was very distracted. She had some thought blocking. Pt. She was william to answer some questions. Pt was less anxious. SW continues to have poor eye contact. Pt. Denies ideations abut admits to hallucinations. Pt. When ask plans to tsya with boyfriend upon d/c. Pt. continues to lack insight, has poor insight. ANT discussed case with the  treating psychiatrist.  Gonzalo Barnett contacted pt.'s boyfriend @ [phone]  For an assessment of baseline. Mr. Prince Swanson was not available. SW  Could not leave a message.

## 2017-09-05 NOTE — BSMART NOTE
OCCUPATIONAL THERAPY PROGRESS NOTE  Group Time:  4159  Attendance: The patient attended full group. Participation:  The patient seemed unable to participate in the activity. Attention:  The patient was unable to attend to the activity. Interaction:  The patient shows minimal awareness of others. .Sat in group entire group, seemed unaware of group discussion, appeared to be responding to internal stimuli, smiling and mouthing words to self.

## 2017-09-05 NOTE — BH NOTES
GROUP THERAPY PROGRESS NOTE    Braden De La Garza is not participating in [employer] [facility]. She demonstrated disinterest and declined after staff's offer, preferring to shower and rest in room during group activity.

## 2017-09-06 PROCEDURE — 74011250637 HC RX REV CODE- 250/637: Performed by: STUDENT IN AN ORGANIZED HEALTH CARE EDUCATION/TRAINING PROGRAM

## 2017-09-06 PROCEDURE — 65220000003 HC RM SEMIPRIVATE PSYCH

## 2017-09-06 RX ADMIN — LITHIUM CARBONATE 300 MG: 300 TABLET, FILM COATED, EXTENDED RELEASE ORAL at 20:24

## 2017-09-06 RX ADMIN — TRAZODONE HYDROCHLORIDE 50 MG: 50 TABLET ORAL at 20:24

## 2017-09-06 RX ADMIN — OLANZAPINE 10 MG: 5 TABLET, ORALLY DISINTEGRATING ORAL at 20:23

## 2017-09-06 RX ADMIN — LITHIUM CARBONATE 300 MG: 300 TABLET, FILM COATED, EXTENDED RELEASE ORAL at 08:54

## 2017-09-06 NOTE — PROGRESS NOTES
Problem: Psychosis  Goal: *STG: Decreased hallucinations  Patient will verbalize a decrease in hallucinations. Outcome: Not Progressing Towards Goal  Pt continues with hallucinations  Goal: *STG: Remains safe in hospital  Patient will remain safe while in hospital.   Outcome: Progressing Towards Goal  Pt remains safe in hospital  Goal: *STG/LTG: Complies with medication therapy  Patient will be compliant with medications during stay in hospital.   Outcome: Progressing Towards Goal  Pt compliant with medication therapy    Comments:   Patient is isolative, eats all meals and takes medications and returns back to room resting in bed most of the day. She sits in milieu briefly, smiles laughs and talks to herself and responding to internal stimuli. When assessed for SI/HI, A/VH, she denies and states she is okay, briefly maintains eye contact, laughing. She does not attend group and activities with staff encouragement. Encouraged use of positive coping skills. Continues to monitor for safety and provide supportive environment.

## 2017-09-06 NOTE — BH NOTES
Psychiatric Progress Note    Date: 17  Patient Name: Kamari Osorio  : 1985  MRN: [id number]  Hospital Day: 10      INTERVAL HISTORY:   Patient is somewhat more conversational today, says she feels better after being moved rooms to [facility] unit. She says she slept better last night. Otherwise, still limited, not speaking much, but better eye contact and appears more engaged in conversation. No side effects from meds, taking meds. Denies SI/HI. Denies AVH      MENTAL STATUS EXAM:  Appearance: Dressed in casual clothes, very thin  Behavior: guarded, fair eye contact,  lying in bed  Motor: slowed  Speech: Normal rate, tone and volume  Mood: \"okay\"  Affect: flat  Thought Process: thought blocking, limited  Thought Content: denies SI/HI  Perception: denies AVH  Poor insight, poor to fair judgment    RISK ASSESSMENT:   Prior Attempts: no noted prior  Lethality of Attempts: none noted prior  Current Ideation/Plan: denies  Protective Factors: limited  Future Orientation: limited    ASSESSMENT:   Still impaired, but improving, somewhat more conversational than days prior    Diagnoses:  Schizoaffective disorder, bipolar type    Plan:  1. Continue with inpatient psychiatric treatment for containment, stabilization and medication management  2. Patient is to continue with group therapy  3. Medications:   Lithium  mg po bid  Zyprexa 10 mg po qhs (5--> 10 17)  4. SW to help with disposition  5.  ELOS 5-7 days

## 2017-09-06 NOTE — BH NOTES
Pt.is a 32year old female hospitalized for having thoughts blocking, disorganization and hallucinations. SW Collateral:  SW contacted pt.'s boyfriend a.m. The boyfriend was not available . Mr. Teresa John pt.'s boyfriend contacted this SW this pm.  He stated the pt. Is not at baseline. Pt. Will talk to him on the phone. The boyfriend stated he came this week end to see pt. The boyfriend stated pt was withdrawn and had limited interacted when he came for the visit. The pt in the past when she was stable communicated more and interacted with others. SW Contact:  SW met with pt to discuss d/c planning. Pt. Was a little alert. Pt. Did not respond much to questions that were ask. Pt. continues to have thought blocking.

## 2017-09-06 NOTE — BH NOTES
Pt.presented with flat affect and frequently engaged in pacing. Pt.dissociated, disoriented to place/time and confused at times with minimal communication with staff unless prompted. She attended recreational group after multiple prompts but declined evening group. No communication with peers and preferred to keep to self in room while resting, if not pacing the halls. She was alert during phone call from friend but with slurred speech/slow to respond. She was compliant with medications, meals, hygiene and memory improved. She was free of falls and refrained from wandering into other pt.'s rooms. Staff will continue to monitor and support.

## 2017-09-06 NOTE — BH NOTES
Chidi Alanis is not participating in Recreational Therapy. Group time: 4816    Personal goal for participation: fresh air break    Goal orientation: social    Group therapy participation: refuse    Therapeutic interventions reviewed and discussed: Staff encouraged Pt.  To participate in group    Impression of participation: Pt refuse, chose to rest in bed despite staff encouragement

## 2017-09-06 NOTE — BH NOTES
Pt. In bed resting at the beginning of shift. Pt. ate dinner then went back to bed. Pt. denies suicidal/homicidal ideations, audio/visual hallucination. Pt contracts for safety on the unit agree to come to staff if feeling harm to self or others. Pt.denies any new medical/pain complaints. Pt.  took scheduled medications. Pt. received a visit from a male friend. Staff encouraged Pt. to  participate in treatment,  medication and group therapy. Pt agreed. Pt. remain free of falls and provided non skid socks. Staff will continue to monitor Pt. for behavior safety and location.

## 2017-09-06 NOTE — BH NOTES
Arely Gonzales is  participating in [place]. Group time: 1058    Personal goal for participation: Community announcement    Goal orientation: community    Group therapy participation: fully participated    Therapeutic interventions reviewed and discussed: Staff discussed  Staff discussed the Mental Health programs offered. Unit schedule for groups,  Visiting hours, patient advocate name and phone number and where this information is posted on the unit. , etc,,. Report any maintenance/housekeeping or treatment concerns to staff so it can be addressed. Impression of participation: Pt.did not have any maintenance/housekeeping or treatment concerns to report to staff .

## 2017-09-07 PROCEDURE — 74011250637 HC RX REV CODE- 250/637: Performed by: STUDENT IN AN ORGANIZED HEALTH CARE EDUCATION/TRAINING PROGRAM

## 2017-09-07 RX ORDER — OLANZAPINE 10 MG/1
10 TABLET ORAL
Qty: 15 TAB | Refills: 0 | Status: SHIPPED | OUTPATIENT
Start: 2017-09-07

## 2017-09-07 RX ORDER — LITHIUM CARBONATE 300 MG/1
300 TABLET, FILM COATED, EXTENDED RELEASE ORAL 2 TIMES DAILY
Qty: 30 TAB | Refills: 0 | Status: SHIPPED | OUTPATIENT
Start: 2017-09-07

## 2017-09-07 RX ADMIN — LITHIUM CARBONATE 300 MG: 300 TABLET, FILM COATED, EXTENDED RELEASE ORAL at 09:02

## 2017-09-07 RX ADMIN — HYDROXYZINE PAMOATE 50 MG: 50 CAPSULE ORAL at 14:57

## 2017-09-07 NOTE — DISCHARGE INSTRUCTIONS
BEHAVIORAL HEALTH NURSING DISCHARGE NOTE    Numbers to remember:  Danikaasuncion Rae: [address] [facility]: [phone]  Suicide Preventions: [phone] Libia Corcoran)    Diet: Regular    The discharge information has been reviewed with the patient. The patient verbalized understanding.     Patient armband removed and shredded

## 2017-09-07 NOTE — BH NOTES
Patient had previously had discharge instructions with RN (see Ck Cazares, RN note.)  Patient is ambulatory and denies pain or other medical complaint at this time. Patient has all personal belongings and belongings were taken downstairs by this nurse when patient was escorted down. Patient's boyfriend, Mr Anai Manuel was waiting for patient to transport patient to home address.

## 2017-09-07 NOTE — BH NOTES
Prescriptions called into [facility] [address] in [place] per doctor Patient requested these medications to be called into [facility] in [place]. When presented with the [facility] [employer] she chose this one. The two medications were Lithobid 300 mg and Zyprexa 10 mg.

## 2017-09-07 NOTE — BH NOTES
Pt.is a 32year old female hospitalized for having thoughts blocking, disorganization and hallucinations. ANT discussed case with the treating psychiatrist    ANT contacted [facility] Board reragridng appointment for pt. ANT was informed Mr. Sage Chavarria will contact ANT with intake appointment. The intake staff provided SW with the following appointments. Pt. Has an appointment scheduled for an intake appointment Sisto Early on 9/21/17 @ 8:00 a.m. At [address] · [phone] and Has a medication management appointment with NP Rahat for 9/25/17 @ 11:00 [facility] [address]  Phone: [phone] fax [phone]. ANT informed the  nursing staff. ANT contacted Mr. Rush How pt.'s boyfriend regarding the pt.'s d/c. He will pick pt up this afternoon. 3:00 ANT Collateral: Mr. Sage Chavarria CSB provided SW earlier appointment for the pt. .  . Pt. Has an appointment scheduled for an intake appointment Sisto Early on 9/12/17 @ 8:00 a.m. At [address] · [phone] and Has a medication management appointment with MAGY Giang for 9/19/17 @ 11:00 [facility] [address]  Phone: [phone] fax [phone]. ANT informed the unit staff. ANT Contact:  ANT met with pt to discuss d/c plan. Pt. denied ideation and hallucinations. SW discussed the importance of continued medication and treatment compliance. ANT discussed appointment with both pt and pt.'s boyfriend.

## 2017-09-07 NOTE — BH NOTES
Pt out of room for meals and to see doctor. Pt is scheduled for discharge today and had made   attempts to make phone call but unable to do so even with staff assistance. Waiting for transportation home. Encouraged compiants with meds and appointments.

## 2017-09-07 NOTE — BH NOTES
GROUP THERAPY PROGRESS NOTE    Marty Beauchamp is not articipating in Recreational Therapy. Although it was   offered to her.

## 2017-09-07 NOTE — BH NOTES
Patient signed JASON. Mr. Caal Gonzales aware patient is ready for discharge and he stated he would be here about [phone]. Prescriptions were called into [facility], patient aware. She is currently pacing on the unit awaiting her ride.

## 2017-09-07 NOTE — DISCHARGE SUMMARY
Psychiatric Discharge Summary    Date: 17   Patient Name: Kamari Osorio  : 1985  MRN: [id number]  Admission Date: 2017  Discharge Date: 17    HISTORY OF PRESENT ILLNESS:   Patient is a 31 yo female hx of schizoaffective disorder who presents in psychotic state with thoughts of harm toward mother. Patient is observed giggling to self, appears internally distracted, lying in bed, guarded, does not sit up and speak to this provider. She does not answer questions directly or takes several moments to respond with bizarre, non-contextual answers. Observed talking with other peers and staff on unit, was exposing self to other patients and had to be escorted back to room. Denies SI/HI at this time. Denies AH/VH but appears internally distracted at times and unaware of surroundings.  [place]:   Once on the unit, patient was socially isolated, remained in room mostly. Initially was not caring for self with poor grooming/ hygiene, very fidgety/ pacing around unit and at night, poor sleep, seen talking to self and internally distracted at times. Zyprexa was started and increased to 10 mg nightly. Lithium was restarted from home medications to 300 mg po bid. After a week in the hospital, patient began stepping out of room, went to groups some, did not participate in group often but did listen. Her appetite improved and was eating consistently. On day of discharge, patient was more conversational, had spontaneous speech and showed some emotions. She denies SI/HI, denies hallucinations, was caring for ADL's, eating and sleeping well.     MENTAL STATUS EXAM:  Orientation: oriented to person, place, time, and situation  Appearance: Dressed in casual clothes with fair grooming and hygiene  Behavior: Cooperative with good eye contact  Motor: No psychomotor agitation/retardation  Speech: Normal rate, tone and volume- more conversational than days prior  Mood: \"good\"  Affect: flat  Thought Process: linear, goal-directed  Thought Content: Denies SI and HI  Perception: Denies AH or VH  Concentration: fair  Memory: fair  Cognition: Alert and oriented  Insight: fair  Judgment: fair    ASSESSMENT at time of discharge:   Stable, calm, cooperative, not suicidal, not homicidal, not psychotic. Diagnoses:  Schizoaffective disorder, bipolar type    Discharge Instructions:  1. Continue psychiatric medications of:  Lithium sr 300 mg po twice daily  zyprexa 10 mg po nightly  2. Please make all follow up appointments with doctors and , as provided by inpatient behavioral health . 3. If you feel unsafe or begin experiencing suicidal thoughts again, please call 9-1-1 or return to the nearest emergency department. Disposition:  Home with outpatient follow-up      Krysten Wright MD

## 2017-09-07 NOTE — BH NOTES
GROUP THERAPY PROGRESS NOTE    Domo Fraser was encouraged by staff but refused to participate in  Community.

## 2017-09-07 NOTE — BH NOTES
Pt food on foot of bed, 3 soda with 2 unopened on floor at side of bed,crumbs all over. Pt was asked by staff to put away food and trash and clean up clutter,also discussed safety possible spill. Pt did not respond when staff discussed need to clean up her room, shared by roommate, pt stayed in bed, states \"you can clean it up\".  Pt later got up for breakfast.
